# Patient Record
Sex: FEMALE | Race: WHITE
[De-identification: names, ages, dates, MRNs, and addresses within clinical notes are randomized per-mention and may not be internally consistent; named-entity substitution may affect disease eponyms.]

---

## 2018-02-16 ENCOUNTER — HOSPITAL ENCOUNTER (EMERGENCY)
Dept: HOSPITAL 4 - SED | Age: 33
Discharge: HOME | End: 2018-02-16
Payer: MEDICAID

## 2018-02-16 VITALS — HEIGHT: 65 IN | WEIGHT: 131 LBS | BODY MASS INDEX: 21.83 KG/M2

## 2018-02-16 VITALS — SYSTOLIC BLOOD PRESSURE: 118 MMHG

## 2018-02-16 VITALS — SYSTOLIC BLOOD PRESSURE: 120 MMHG

## 2018-02-16 DIAGNOSIS — S61.011D: Primary | ICD-10-CM
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
## 2018-02-16 NOTE — NUR
Patient given written and verbal discharge instructions and verbalizes 
understanding.  ER MD discussed with patient the results and treatment 
provided. Patient in stable condition. ID arm band removed.

No RX given. Patient educated on pain management and to follow up with PMD. 
Pain Scale 0.

Opportunity for questions provided and answered.



Patient left ER ambulating with slow, steady gait in no acute distress. No 
active bleeding noted.

## 2018-02-16 NOTE — NUR
Patient to ER via triage with c/o bleeding to right thumb, s/p laceration 
repair on 2/14/18 with skin glue. Patient denies pain at present, patient is 
awake, alert and oriented in no acute distress. Vital signs stable, 
respirations even and unlabored, skin warm and dry to touch. Awaiting 
evaluation by ER MD, will continue to observe and assess.